# Patient Record
Sex: FEMALE | Race: WHITE | Employment: FULL TIME | URBAN - METROPOLITAN AREA
[De-identification: names, ages, dates, MRNs, and addresses within clinical notes are randomized per-mention and may not be internally consistent; named-entity substitution may affect disease eponyms.]

---
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
## 2021-01-21 NOTE — GROUP NOTE
Group Therapy Note    Date: 9/16/2020    Group Start Time: 0900  Group End Time: 0920  Group Topic: Community Meeting    LIZANDRO IBARRA    MAKSIM Huggins    Group Therapy Note    Attendees: 5    Patient's Goal:  Pt will identify one daily goal and demonstrate understanding of unit guidelines and schedule    Notes:  Pt attended group and participated. Status After Intervention:  Improved    Participation Level:  Active Listener and Interactive    Participation Quality: Appropriate, Attentive, Sharing and Supportive      Speech:  normal      Thought Process/Content: Logical  Linear      Affective Functioning: Constricted/Restricted      Mood: euthymic      Level of consciousness:  Alert, Oriented x4 and Attentive      Response to Learning: Able to verbalize current knowledge/experience, Able to verbalize/acknowledge new learning, Able to retain information, Capable of insight, Able to change behavior and Progressing to goal      Endings: None Reported    Modes of Intervention: Education, Support, Socialization, Exploration, Activity and Limit-setting      Discipline Responsible: Psychoeducational Specialist      Signature:  Abrahan Huggins None

## 2021-02-11 ENCOUNTER — TELEPHONE (OUTPATIENT)
Dept: PSYCHIATRY | Age: 21
End: 2021-02-11

## 2021-02-11 DIAGNOSIS — F32.4 MAJOR DEPRESSIVE DISORDER WITH SINGLE EPISODE, IN PARTIAL REMISSION (HCC): Primary | ICD-10-CM

## 2021-02-11 RX ORDER — VENLAFAXINE HYDROCHLORIDE 37.5 MG/1
37.5 CAPSULE, EXTENDED RELEASE ORAL
Qty: 30 CAPSULE | Refills: 3 | Status: SHIPPED | OUTPATIENT
Start: 2021-02-11 | End: 2021-08-29

## 2021-02-11 NOTE — TELEPHONE ENCOUNTER
.Please Approve or Refuse. Send to Pharmacy per Pt's Request:      Next Visit Date:  Visit date not found   Last Visit Date: Visit date not found    Refill approved.      Lucius Watson

## 2021-08-29 ENCOUNTER — HOSPITAL ENCOUNTER (EMERGENCY)
Age: 21
Discharge: HOME | End: 2021-08-29
Attending: EMERGENCY MEDICINE
Payer: COMMERCIAL

## 2021-08-29 ENCOUNTER — APPOINTMENT (OUTPATIENT)
Dept: GENERAL RADIOLOGY | Age: 21
End: 2021-08-29
Payer: COMMERCIAL

## 2021-08-29 VITALS
SYSTOLIC BLOOD PRESSURE: 124 MMHG | HEART RATE: 106 BPM | TEMPERATURE: 98.3 F | DIASTOLIC BLOOD PRESSURE: 83 MMHG | RESPIRATION RATE: 16 BRPM | BODY MASS INDEX: 23.92 KG/M2 | WEIGHT: 130 LBS | OXYGEN SATURATION: 96 % | HEIGHT: 62 IN

## 2021-08-29 DIAGNOSIS — F32.A DEPRESSION, UNSPECIFIED DEPRESSION TYPE: ICD-10-CM

## 2021-08-29 DIAGNOSIS — S60.221A CONTUSION OF RIGHT HAND, INITIAL ENCOUNTER: Primary | ICD-10-CM

## 2021-08-29 PROCEDURE — 73130 X-RAY EXAM OF HAND: CPT

## 2021-08-29 PROCEDURE — 99283 EMERGENCY DEPT VISIT LOW MDM: CPT

## 2021-08-29 RX ORDER — DEXTROAMPHETAMINE SACCHARATE, AMPHETAMINE ASPARTATE MONOHYDRATE, DEXTROAMPHETAMINE SULFATE AND AMPHETAMINE SULFATE 2.5; 2.5; 2.5; 2.5 MG/1; MG/1; MG/1; MG/1
10 CAPSULE, EXTENDED RELEASE ORAL EVERY MORNING
COMMUNITY

## 2021-08-29 ASSESSMENT — ENCOUNTER SYMPTOMS
EYE DISCHARGE: 0
SINUS PRESSURE: 0
RHINORRHEA: 0
NAUSEA: 0
BLOOD IN STOOL: 0
ABDOMINAL PAIN: 0
CONSTIPATION: 0
VOMITING: 0
CHEST TIGHTNESS: 0
WHEEZING: 0
FACIAL SWELLING: 0
EYE REDNESS: 0
DIARRHEA: 0
SORE THROAT: 0
EYE PAIN: 0
COUGH: 0
BACK PAIN: 0
TROUBLE SWALLOWING: 0
SHORTNESS OF BREATH: 0
COLOR CHANGE: 0

## 2021-08-29 ASSESSMENT — PAIN - FUNCTIONAL ASSESSMENT: PAIN_FUNCTIONAL_ASSESSMENT: 0-10
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
## 2021-08-29 NOTE — ED TRIAGE NOTES
Patient's personal belongings secured and patient changed into blue gown by [employer] Safeguard informed of 1:1 watch and that they must be able to view patient's face at all time and may not leave at any time, under any circumstances. Safeguard instructed to call on radio or yell for help if patient attempts to leave or harm self/others. Mode of arrival (squad #, walk in, police, etc) : [employer]      Chief complaint(s):hand injury and mental health problems      Arrival Note (brief scenario, treatment PTA, etc). : in argument with mother today about her patient dog she  and stated hit wall and ground with fist pain to right hand . States not suicidal or homicidal at present have not had medication for few days also due to doctor leaving practice         C= \"Have you ever felt that you should Cut down on your drinking? \"  No  A= \"Have people Annoyed you by criticizing your drinking? \"  No  G= \"Have you ever felt bad or Guilty about your drinking? \"  No  E= \"Have you ever had a drink as an Eye-opener first thing in the morning to steady your nerves or to help a hangover? \"  No      Deferred []      Reason for deferring: N/A    *If yes to two or more: probable alcohol abuse. *

## 2021-08-29 NOTE — SUICIDE SAFETY PLAN
Counselor  Friends  Family   Crisis line        4. Professionals or [address] I can contact during a crisis: Who can I call for help - for example, my doctor, my psychiatrist, my psychologist, a mental health provider, a suicide hotline? [place] (24 hours / 7 days a week access)  818-538-Care ([phone])    COVID-19 Emotional Support Line: [phone]  Suicide Prevention Lifeline: 1-800-273-TALK (5757)  Text \"4Hope\" to 878494      5. [facility] Emergency Crisis Services Numbers:      [place] (24 hours / 7 days a week acess)  837-259-Care ([phone])    COVID-19 Emotional Support Line: [phone]  Suicide Prevention Lifeline: 1-800-273-TALK (6910)  Text \"4Hope\" to [address]     1. BellyBio  Free addie that teaches a deep breathing technique useful in fighting anxiety and stress. A simple interface uses biofeedback to monitor your breathing. Sounds cascade with the movements of your belly, in rhythms reminiscent of waves on a beach. Charts also let you know how you're doing. A great tool when you need to slow down and breathe. 2. Operation Reach Out  Literally a lifesaving addie, this free intervention tool helps people who are having suicidal thoughts to reassess their thinking and get help. Recommended by followers of, who report that this addie has helped in suicidal crises. Developed by the [employer], but useful to all. [place] a download even if you're not suicidal. You never know if you might need it. 3. eCBT Calm  Provides a set of tools to help you evaluate personal stress and anxiety, challenge distorted thoughts, and learn relaxation skills that have been scientifically validated in research on Cognitive Behavioral Therapy (CBT). Lots of background and useful information along with step-by-step guides. 4. Deep Sleep with Gilson Lava  Getting enough sleep is one of the foundations of mental health.  A personal favorite I listen to all the time, this straightforward addie features a warm, gentle voice guiding listeners through a Progressive Muscle Relaxation (PMR) session and into sleep. Features long or short induction options, and an alarm. 5. WhatsMyM3  A three minute depression and anxiety screen. Validated questionnaires assess symptoms of depression, anxiety, bipolar disorder, and PTSD, and combine into a score that indicates whether or not your life is impacted significantly by a mood disorder, recommending a course of action. The addie keeps a history of test results, to help you track your progress. 6. DBT Diary Card and Skills   Based on Dialectical Behavior Therapy (DBT) developed by psychologist Gary Jeffers, this addie is a rich resource of self-help skills, reminders of the therapy principles, and coaching tools for coping. Created by a therapist with years of experience in the practice, this addie is not intended to replace a professional but helps people reinforce their treatment. 7. Optimism  Track your moods, keep a journal, and chart your recovery progress with this comprehensive tool for depression, bipolar disorder, and anxiety disorders. One of the most popular mood tracking apps available, with plenty of features. Free. 8. iSleepEasy  A calm female voice helps you quell anxieties and take the time to relax and sleep, in an array of guided meditations. Separately controlled voice and music tracks, flexible lengths, and an alarm. Includes a special wee hours rescue track, and tips for falling asleep. Developed by [employer], who offer an great line of relaxation apps. 9. Magic Window  Living Pictures  Not technically a mental health addie, it makes no miraculous claims about curbing anxiety. However, there is independent research indicating that taking breaks and getting exposure to nature, even in videos, can reduce stress.  This addie offers an assortment of peaceful, ambient nature scenes from beautiful spots around the world. 10. Relax Melodies  A popular free relaxation sound and music addie. Mix and match nature sounds with new age music; it's jasmin to listen to birds in the rain while a piano softly plays. 6.   Making the environment safe: How can I make my environment (house/apartment/living space) safer? Remove weapons, cutting objects, get rid of extra medications, household chemicals. If you begin to have suicidal ideations -Involve your support system to implement your safety plan right away. Call 911 immediately or go to your local Emergency Room if you cannot contract to be safe.

## 2022-06-27 ENCOUNTER — HOSPITAL ENCOUNTER (EMERGENCY)
Age: 22
Discharge: HOME | End: 2022-06-27
Attending: EMERGENCY MEDICINE
Payer: COMMERCIAL

## 2022-06-27 VITALS
WEIGHT: 118 LBS | TEMPERATURE: 98.2 F | SYSTOLIC BLOOD PRESSURE: 120 MMHG | OXYGEN SATURATION: 98 % | DIASTOLIC BLOOD PRESSURE: 91 MMHG | RESPIRATION RATE: 20 BRPM | HEIGHT: 63 IN | BODY MASS INDEX: 20.91 KG/M2 | HEART RATE: 88 BPM

## 2022-06-27 DIAGNOSIS — Z76.89 ENCOUNTER FOR PSYCHIATRIC ASSESSMENT: Primary | ICD-10-CM

## 2022-06-27 PROCEDURE — 99285 EMERGENCY DEPT VISIT HI MDM: CPT

## 2022-06-27 ASSESSMENT — ENCOUNTER SYMPTOMS
BACK PAIN: 0
EYE PAIN: 0
COLOR CHANGE: 0
ABDOMINAL PAIN: 0
SHORTNESS OF BREATH: 0

## 2022-06-27 ASSESSMENT — PAIN DESCRIPTION - LOCATION: LOCATION: LEG

## 2022-06-27 ASSESSMENT — PAIN DESCRIPTION - ORIENTATION: ORIENTATION: LEFT;MID;ANTERIOR

## 2022-06-27 ASSESSMENT — PAIN SCALES - GENERAL: PAINLEVEL_OUTOF10: 3

## 2022-06-27 ASSESSMENT — PAIN DESCRIPTION - DESCRIPTORS: DESCRIPTORS: BURNING;TIGHTNESS

## 2022-06-27 ASSESSMENT — LIFESTYLE VARIABLES: HOW OFTEN DO YOU HAVE A DRINK CONTAINING ALCOHOL: NEVER

## 2022-06-27 ASSESSMENT — PAIN - FUNCTIONAL ASSESSMENT: PAIN_FUNCTIONAL_ASSESSMENT: 0-10

## 2022-06-27 NOTE — ED PROVIDER NOTES
EMERGENCY DEPARTMENT ENCOUNTER    Pt Name: Anthony Morataya  MRN: [id number]  [place] 2000  Date of evaluation: 6/27/22  CHIEF COMPLAINT       Chief Complaint   Patient presents with    Mental Health Problem     HISTORY OF PRESENT ILLNESS   20-year-old female presents for mental health evaluation. Patient was reportedly at work today and became upset with a coworker and reportedly stated that she was suicidal patient was then brought for evaluation. Patient reports that she has been off of her medication for the last few days because she ran out of her meds. Patient reports that her meds have otherwise been working well for her, currently denies any suicidal ideation, denies any plan of suicide, denies any homicidal ideation, denies any visual auditory hallucinations, denies any recent alcohol or drug use, denying other complaints at this time. The history is provided by the patient. REVIEW OF SYSTEMS     Review of Systems   Constitutional: Negative for fever. HENT: Negative for congestion and ear pain. Eyes: Negative for pain. Respiratory: Negative for shortness of breath. Cardiovascular: Negative for chest pain, palpitations and leg swelling. Gastrointestinal: Negative for abdominal pain. Genitourinary: Negative for dysuria and flank pain. Musculoskeletal: Negative for back pain. Skin: Negative for color change. Neurological: Negative for numbness and headaches. Psychiatric/Behavioral: Negative for confusion, hallucinations and suicidal ideas. All other systems reviewed and are negative.     PASTMEDICAL HISTORY     Past Medical History:   Diagnosis Date    acne 7/15/2014    Attention deficit disorder without mention of hyperactivity 7/15/2014    Bipolar 1 disorder (HCC)     Borderline personality disorder [facility])      Past Problem List  Patient Active Problem List   Diagnosis Code    Viral warts B07.9    Attention deficit disorder F98.8    acne L70.8    Major depressive disorder with single episode, in partial remission ([facility] [address] F32.4    Depression F32. A    Anxiety F41.9    Depression with suicidal ideation F32. 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 No pertinent surgical history. CURRENT MEDICATIONS       Discharge Medication List as of 6/27/2022  3:06 PM      CONTINUE these medications which have NOT CHANGED    Details   amphetamine-dextroamphetamine (ADDERALL XR) 10 MG extended release capsule Take 10 mg by mouth every morning. Historical Med      cephALEXin (KEFLEX) 250 MG capsule Take 1 capsule by mouth 2 times daily, Disp-8 capsule,R-0Normal      traZODone (DESYREL) 50 MG tablet Take 50 mg by mouth nightlyHistorical Med      hydrOXYzine (ATARAX) 25 MG tablet Take 10 mg by mouth 3 times daily as needed for AnxietyHistorical Med           ALLERGIES     is allergic to lamictal [lamotrigine] and provera [medroxyprogesterone acetate]. FAMILY HISTORY     She indicated that her mother is alive. She indicated that the status of her father is unknown. She indicated that the status of her brother is unknown. She indicated that her maternal grandmother is alive. She indicated that the status of her paternal grandmother is unknown. She indicated that the status of her paternal grandfather is unknown. She indicated that the status of her maternal cousin is unknown. SOCIAL HISTORY       Social History     Tobacco Use    Smoking status: Never Smoker    Smokeless tobacco: Never Used   Substance Use Topics    Alcohol use: No    Drug use: Not Currently     Types: Marijuana (Weed)     PHYSICAL EXAM     INITIAL VITALS: BP (!) 120/91   Pulse 88   Temp 98.2 °F (36.8 °C) (Oral)   Resp 20   Ht 5' 3\" (1.6 m)   Wt 118 lb (53.5 kg)   LMP 06/15/2022 (Exact Date)   SpO2 98%   BMI 20.90 kg/m²    Physical Exam  Vitals and nursing note reviewed.    Constitutional: homicidal ideation, states that she been off her medication for the last couple days states that she got upset with someone at work and reports that she just became very upset reports no intention of killing herself, denies any plan, denies any homicidal ideation, patient reports that she has a appointment with her psychiatrist for refill of meds this week, patient to be followed by social work    Patient was evaluated social work as well, discussed with social work both myself and social work do not feel patient would benefit from an admission at this time and she is not meeting inpatient criteria as she has normal active suicidal thoughts or plan at this time, patient will be discharged with outpatient follow-up as follow-up with her psychiatrist on Friday, will be discharged with her boyfriend who reports that he will be with her for the next few days    Plan was discussed with patient and boyfriend, discussed strict return precautions, both patient and significant other voiced understanding and are comfortable with plan and discharge home    Patient/Guardian was informed of their diagnosis and told to follow up with PCP & [place] in 1-3 days. Patient demonstrates understanding and agreement with the plan. They were given the opportunity to ask questions and those questions were answered to the best of our ability with the available information. Patient/Guardian told to return to the ED for any new, worsening, changing or persistent symptoms. This dictation was prepared using [employer] voice recognition software.          CRITICAL CARE:       PROCEDURES:    Procedures    DIAGNOSTIC RESULTS   EKG:All EKG's are interpreted by the Emergency Department Physician who either signs or Co-signs this chart in the absence of a cardiologist.        RADIOLOGY:All plain film, CT, MRI, and formal ultrasound images (except ED bedside ultrasound) are read by the radiologist, see reports below, unless otherwisenoted in MDM or here. No orders to display     LABS: All lab results were reviewed by myself, and all abnormals are listed below. Labs Reviewed - No data to display    EMERGENCY DEPARTMENTCOURSE:         Vitals:    Vitals:    06/27/22 1419   BP: (!) 120/91   Pulse: 88   Resp: 20   Temp: 98.2 °F (36.8 °C)   TempSrc: Oral   SpO2: 98%   Weight: 118 lb (53.5 kg)   Height: 5' 3\" (1.6 m)       The patient was given the following medications while in the emergency department:  No orders of the defined types were placed in this encounter. CONSULTS:  None    FINAL IMPRESSION      1. Encounter for psychiatric assessment          DISPOSITION/PLAN   DISPOSITION Decision To Discharge 06/27/2022 03:00:39 PM      PATIENT REFERRED TO:  [facility]  [address]  [address] [facility] [address]  [phone]    As needed, If symptoms worsen    [facility]  [address] [facility]  [phone]  Schedule an appointment as soon as possible for a visit       [facility]  C[address]Vicente Cid  [address] [facility]    Go to       [address]:  Discharge Medication List as of 6/27/2022  3:06 PM        The care is provided during an unprecedented national emergency due to the novel coronavirus, COVID 19.   [address], DO                    [address], DO  06/27/22 8618

## 2022-06-27 NOTE — ED NOTES
Psychosocial and Contextual Factors:   Pt got into fight at work where she threatened suicide. Pt reported no true SI. Pt's fiance reported pt mentioned suicide a month ago, but mentioned no plan. C-SSRS Summary:      Patient: X  Family: X (Fisaige)  Agency:     Substance Abuse: None indicated     Present Suicidal Behavior:   Pt got into fight at work where she threatened suicide. Pt reported no true SI. Pt reported no true SI. Pt's fiance reported pt mentioned suicide a month ago, but mentioned no plan. Verbal: Yes    Attempt:None    Past Suicidal Behavior: Pt reported past attempt in 2016. Verbal:Yes     Attempt:Yes       Self-Injurious/Self-Mutilation:Denied       Violence Current or Past Denied       Trauma Identified:  Reported past trauma, but did not elaborate. Protective Factors:    Supportive fiance      Risk Factors:    Trauma hx       Clinical Summary:    Pt is a 25 y.o. female who got into fight at work where she threatened suicide. Pt reported no true SI. Pt's fiance reported pt mentioned suicide a month ago, but mentioned no plan. Pt reported she could stay with saige until her next therapy appointment at [facility]. Pt's fiance agreeable to this plan and committed supervising pt until her appt on Friday. Level of Care Disposition:    Pt agreeable to staying with saige for safety until next [facility] appointment.

## 2022-06-27 NOTE — ED TRIAGE NOTES
The patient arrived by ambulance and is accompanied by EMS provider ([address] [place] [address]). Pt reported that she was at work at the [employer] when she began to feel suicidal related to the situations surrounding her. Pt reported that her interactions with her boss prompted these thoughts. Pt also stated that her psychologist canceled her appointment last Friday, resulting in her running out of her medications. The patient stated that her current medication regimen is working well for her and she has not felt this way in some time. Pt reports history of suicide attempt at age 14y. Pt also reports history of suicidal thoughts since age 18y. Pt denies plan, but stated that at the time she was \"freaking out\" that she wished she \"didn't want to exist anymore\" because everyone she cares about hurts her. Pt denies all forms of abuse. Pt stated that now that she is \"out of that environment\" that she is feeling better and is no longer feeling suicidal. Pt reports that she has had feelings in the past that occur while she is under stress, then subside after the stressor is removed. Pt reported that ida is coming to the hospital to escort her home if she is discharged today. Dr. Greg Briseno at bedside. Denies need to change patient out of plain clothes. Sitter remains at bedside. Call light in reach. C= \"Have you ever felt that you should Cut down on your drinking? \"  No  A= \"Have people Annoyed you by criticizing your drinking? \"  No  G= \"Have you ever felt bad or Guilty about your drinking? \"  No  E= \"Have you ever had a drink as an Eye-opener first thing in the morning to steady your nerves or to help a hangover? \"  No      Deferred []      Reason for deferring: N/A    *If yes to two or more: probable alcohol abuse. *

## 2022-08-28 ENCOUNTER — HOSPITAL ENCOUNTER (EMERGENCY)
Age: 22
Discharge: HOME | End: 2022-08-28
Attending: EMERGENCY MEDICINE
Payer: COMMERCIAL

## 2022-08-28 VITALS
RESPIRATION RATE: 16 BRPM | TEMPERATURE: 97.5 F | OXYGEN SATURATION: 98 % | SYSTOLIC BLOOD PRESSURE: 119 MMHG | HEART RATE: 77 BPM | DIASTOLIC BLOOD PRESSURE: 81 MMHG

## 2022-08-28 DIAGNOSIS — N12 PYELONEPHRITIS: Primary | ICD-10-CM

## 2022-08-28 LAB
ABSOLUTE EOS #: 0.03 K/UL (ref 0–0.44)
ABSOLUTE IMMATURE GRANULOCYTE: <0.03 K/UL (ref 0–0.3)
ABSOLUTE LYMPH #: 1.7 K/UL (ref 1.1–3.7)
ABSOLUTE MONO #: 0.57 K/UL (ref 0.1–1.4)
ALBUMIN SERPL-MCNC: 4.6 G/DL (ref 3.5–5.2)
ALBUMIN/GLOBULIN RATIO: 1.4 (ref 1–2.5)
ALP BLD-CCNC: 47 U/L (ref 35–104)
ALT SERPL-CCNC: 19 U/L (ref 5–33)
ANION GAP SERPL CALCULATED.3IONS-SCNC: 9 MMOL/L (ref 9–17)
AST SERPL-CCNC: 17 U/L
BASOPHILS # BLD: 0 % (ref 0–2)
BASOPHILS ABSOLUTE: <0.03 K/UL (ref 0–0.2)
BILIRUB SERPL-MCNC: 0.51 MG/DL (ref 0.3–1.2)
BILIRUBIN URINE: NEGATIVE
BUN BLDV-MCNC: 14 MG/DL (ref 6–20)
CALCIUM SERPL-MCNC: 9.4 MG/DL (ref 8.6–10.4)
CASTS UA: NORMAL /LPF (ref 0–8)
CHLORIDE BLD-SCNC: 105 MMOL/L (ref 98–107)
CO2: 25 MMOL/L (ref 20–31)
COLOR: YELLOW
CREAT SERPL-MCNC: 0.72 MG/DL (ref 0.5–0.9)
EOSINOPHILS RELATIVE PERCENT: 0 % (ref 1–4)
EPITHELIAL CELLS UA: NORMAL /HPF (ref 0–5)
GFR AFRICAN AMERICAN: >60 ML/MIN
GFR NON-AFRICAN AMERICAN: >60 ML/MIN
GFR SERPL CREATININE-BSD FRML MDRD: NORMAL ML/MIN/{1.73_M2}
GLUCOSE BLD-MCNC: 78 MG/DL (ref 70–99)
GLUCOSE URINE: NEGATIVE
HCG QUALITATIVE: NEGATIVE
HCT VFR BLD CALC: 43.9 % (ref 36.3–47.1)
HEMOGLOBIN: 14.4 G/DL (ref 11.9–15.1)
IMMATURE GRANULOCYTES: 0 %
KETONES, URINE: NEGATIVE
LEUKOCYTE ESTERASE, URINE: ABNORMAL
LIPASE: 40 U/L (ref 13–60)
LYMPHOCYTES # BLD: 24 % (ref 25–45)
MCH RBC QN AUTO: 30.1 PG (ref 25.2–33.5)
MCHC RBC AUTO-ENTMCNC: 32.8 G/DL (ref 28.4–34.8)
MCV RBC AUTO: 91.8 FL (ref 82.6–102.9)
MONOCYTES # BLD: 8 % (ref 2–8)
NITRITE, URINE: NEGATIVE
NRBC AUTOMATED: 0 PER 100 WBC
PDW BLD-RTO: 12.4 % (ref 11.8–14.4)
PH UA: 5.5 (ref 5–8)
PLATELET # BLD: 312 K/UL (ref 138–453)
PMV BLD AUTO: 10.8 FL (ref 8.1–13.5)
POTASSIUM SERPL-SCNC: 4.6 MMOL/L (ref 3.7–5.3)
PROTEIN UA: ABNORMAL
RBC # BLD: 4.78 M/UL (ref 3.95–5.11)
RBC UA: NORMAL /HPF (ref 0–4)
SEG NEUTROPHILS: 68 % (ref 34–64)
SEGMENTED NEUTROPHILS ABSOLUTE COUNT: 4.79 K/UL (ref 1.5–8.1)
SODIUM BLD-SCNC: 139 MMOL/L (ref 135–144)
SPECIFIC GRAVITY UA: 1.03 (ref 1–1.03)
TOTAL PROTEIN: 7.8 G/DL (ref 6.4–8.3)
TURBIDITY: CLEAR
URINE HGB: NEGATIVE
UROBILINOGEN, URINE: NORMAL
WBC # BLD: 7.1 K/UL (ref 4.5–13.5)
WBC UA: NORMAL /HPF (ref 0–5)

## 2022-08-28 PROCEDURE — 81001 URINALYSIS AUTO W/SCOPE: CPT

## 2022-08-28 PROCEDURE — 84703 CHORIONIC GONADOTROPIN ASSAY: CPT

## 2022-08-28 PROCEDURE — 87086 URINE CULTURE/COLONY COUNT: CPT

## 2022-08-28 PROCEDURE — 85025 COMPLETE CBC W/AUTO DIFF WBC: CPT

## 2022-08-28 PROCEDURE — 83690 ASSAY OF LIPASE: CPT

## 2022-08-28 PROCEDURE — 99283 EMERGENCY DEPT VISIT LOW MDM: CPT

## 2022-08-28 PROCEDURE — 80053 COMPREHEN METABOLIC PANEL: CPT

## 2022-08-28 PROCEDURE — 6370000000 HC RX 637 (ALT 250 FOR IP)

## 2022-08-28 RX ORDER — FAMOTIDINE 20 MG/1
20 TABLET, FILM COATED ORAL 2 TIMES DAILY
Qty: 14 TABLET | Refills: 0 | Status: SHIPPED | OUTPATIENT
Start: 2022-08-28 | End: 2022-09-04

## 2022-08-28 RX ORDER — FAMOTIDINE 20 MG/1
20 TABLET, FILM COATED ORAL ONCE
Status: COMPLETED | OUTPATIENT
Start: 2022-08-28 | End: 2022-08-28

## 2022-08-28 RX ADMIN — FAMOTIDINE 20 MG: 20 TABLET, FILM COATED ORAL at 11:21

## 2022-08-28 ASSESSMENT — PAIN - FUNCTIONAL ASSESSMENT: PAIN_FUNCTIONAL_ASSESSMENT: 0-10

## 2022-08-28 ASSESSMENT — PAIN SCALES - GENERAL
PAINLEVEL_OUTOF10: 8
PAINLEVEL_OUTOF10: 7

## 2022-08-28 ASSESSMENT — PAIN DESCRIPTION - LOCATION: LOCATION: FLANK

## 2022-08-28 ASSESSMENT — PAIN DESCRIPTION - PAIN TYPE: TYPE: ACUTE PAIN

## 2022-08-28 ASSESSMENT — PAIN DESCRIPTION - ORIENTATION: ORIENTATION: LEFT

## 2022-08-28 NOTE — DISCHARGE INSTRUCTIONS
Thank you for visiting [address] [place] [facility] Emergency Department. You need to call [facility] to make an appointment as directed for follow up. Should you have any questions regarding your care or further treatment, please call [facility] Emergency Department at [phone]. Continue ciprofloxacin as prescribed.

## 2022-08-28 NOTE — ED PROVIDER NOTES
[facility]     Emergency Department     Faculty Note/ Attestation      Pt Name: Sunita Rousseau                                       MRN: [id number]  [place] 2000  Date of evaluation: 8/28/2022    Patients PCP:    No primary care provider on file. Attestation  I performed a history and physical examination of the patient and discussed management with the resident. I reviewed the residents note and agree with the documented findings and plan of care. Any areas of disagreement are noted on the chart. I was personally present for the key portions of any procedures. I have documented in the chart those procedures where I was not present during the key portions. I have reviewed the emergency nurses triage note. I agree with the chief complaint, past medical history, past surgical history, allergies, medications, social and family history as documented unless otherwise noted below. For Physician Assistant/ Nurse Practitioner cases/documentation I have personally evaluated this patient and have completed at least one if not all key elements of the E/M (history, physical exam, and MDM). Additional findings are as noted. Initial Screens:        [place] Coma Scale  Eye Opening: Spontaneous  Best Verbal Response: Oriented  Best Motor Response: Obeys commands  [place] Coma Scale Score: 15    Vitals:    Vitals:    08/28/22 0900   BP: 119/81   Pulse: 77   Resp: 16   Temp: 97.5 °F (36.4 °C)   TempSrc: Oral   SpO2: 98%       CHIEF COMPLAINT     No chief complaint on file. The pt is a 24 YO who presents with LUQ abd pain she was diagnosed with pyeloneprhitis with klebsiella and sensitive to ciprofloxacin and taking it as directly. The pt still having the vomiting but the burning pain with urination is still going on. The pain is lateral flank not really abdomen.           EMERGENCY DEPARTMENT COURSE:     -------------------------  BP: 119/81, Temp: 97.5 °F (36.4 °C), Heart Rate: 77, Resp: 16  Physical Exam  Constitutional:       Appearance: She is well-developed. She is not diaphoretic. HENT:      Head: Normocephalic and atraumatic. Right Ear: External ear normal.      Left Ear: External ear normal.   Eyes:      General: No scleral icterus. Right eye: No discharge. Left eye: No discharge. Neck:      Trachea: No tracheal deviation. Pulmonary:      Effort: Pulmonary effort is normal. No respiratory distress. Breath sounds: No stridor. Abdominal:      General: There is no distension. Tenderness: There is abdominal tenderness (only tender along the L flank no pain elsewer in abd). Musculoskeletal:         General: Normal range of motion. Cervical back: Normal range of motion. Skin:     General: Skin is warm and dry. Neurological:      Mental Status: She is alert and oriented to person, place, and time. Coordination: Coordination normal.   Psychiatric:         Behavior: Behavior normal.         Comments  The patient presents complaining of LUQ abdominal /flankpain. Based on exam of the patient the patient is very unlikely to have: PE pneumonia without any shortness of breath leg swelling or edema patient has lipase pending as well as labs for possible abscess discussed possibility of CT with patient at this time no signs of pneumonia on physical exam clear lungs to auscultation bilaterally most concerning and most likely diagnosis is still likely clearing pyelonephritis    Pt likely clearing pyeloneprhtis    ED Course as of 08/28/22 1617   Sun Aug 28, 2022   1112 WBC, UA: 10 TO 20 [AR]   1112 Epithelial Cells, UA: 5 TO 10 [AR]   1112 Leukocyte Esterase, Urine(!): TRACE [AR]   1112 Lipase: 40 [AR]   1112 WBC: 7.1 [AR]      ED Course User Index  [AR] MD Lena Myles Score, DO,, DO, RDMS.   Attending Emergency Physician         Lena Score, DO  08/28/22 4770

## 2022-08-28 NOTE — ED PROVIDER NOTES
[facility] ED  Emergency Department Encounter  Emergency Medicine Resident     Pt Baldomero Horn  MRN: [id number]  [place] 2000  Date of evaluation: 8/28/22  PCP:  No primary care provider on file. CHIEF COMPLAINT       No chief complaint on file. HISTORY OF PRESENT ILLNESS  (Location/Symptom, Timing/Onset, Context/Setting, Quality, Duration, Modifying Factors, Severity.)      Carl Mason is a 25 y.o. female who presents with complaints of constant left flank pain that radiates to left upper quadrant. Patient notes she was diagnosed with pyelonephritis few days ago and has been taking Cipro. Patient notes vomiting improved but dysuria still mildly present. No diarrhea or constipation. No chest pain or shortness of breath. Patient noted pain is worse after eating and reproduced in left flank. On chart review, urine culture 8/23/2022 showed Klebsiella pneumonia that is sensitive to Cipro. PAST MEDICAL / SURGICAL / SOCIAL / FAMILY HISTORY      has a past medical history of acne, Attention deficit disorder without mention of hyperactivity, Bipolar 1 disorder ([facility] [address] and Borderline personality disorder ([facility] [address] Reviewed with patient     has no past surgical history on file.   Reviewed with patient    Social History     Socioeconomic History    Marital status: Single     Spouse name: Not on file    Number of children: Not on file    Years of education: Not on file    Highest education level: Not on file   Occupational History    Not on file   Tobacco Use    Smoking status: Never    Smokeless tobacco: Never   Substance and Sexual Activity    Alcohol use: No    Drug use: Not Currently     Types: Marijuana Liz Sims)    Sexual activity: Yes     Partners: Male     Birth control/protection: Pill   Other Topics Concern    Not on file   Social History Narrative    Not on file     Social Determinants of Health     Financial Resource Strain: Not on file   Food Insecurity: Not on file   Transportation Needs: Not on file   Physical Activity: Not on file   Stress: Not on file   Social Connections: Not on file   Intimate Partner Violence: Not on file   Housing Stability: Not on file       Family History   Problem Relation Age of Onset    High Blood Pressure Mother     Depression Mother     Migraines Father     High Cholesterol Father     Migraines Brother     High Blood Pressure Maternal Grandmother     High Cholesterol Maternal Grandmother     Depression Maternal Cousin     Alcohol Abuse Paternal Grandmother     Alcohol Abuse Paternal Grandfather        Allergies:  Lamictal [lamotrigine] and Provera [medroxyprogesterone acetate]    Home Medications:  Prior to Admission medications    Medication Sig Start Date End Date Taking? Authorizing Provider   famotidine (PEPCID) 20 MG tablet Take 1 tablet by mouth 2 times daily for 7 days 8/28/22 9/4/22 Yes Tea Raymond MD   amphetamine-dextroamphetamine (ADDERALL XR) 10 MG extended release capsule Take 10 mg by mouth every morning. Historical Provider, MD   cephALEXin (KEFLEX) 250 MG capsule Take 1 capsule by mouth 2 times daily 9/19/20   Janelle Pearson MD   traZODone (DESYREL) 50 MG tablet Take 50 mg by mouth nightly    Historical Provider, MD   hydrOXYzine (ATARAX) 25 MG tablet Take 10 mg by mouth 3 times daily as needed for Anxiety    Historical Provider, MD       REVIEW OF SYSTEMS    (2-9 systems for level 4, 10 or more for level 5)      Review of Systems   Constitutional:  Negative for chills and fever. HENT:  Negative for congestion and rhinorrhea. Eyes:  Negative for photophobia and visual disturbance. Respiratory:  Negative for shortness of breath and wheezing. Cardiovascular:  Negative for chest pain and palpitations. Gastrointestinal:  Negative for abdominal pain, nausea and vomiting. Genitourinary:  Positive for dysuria and flank pain. Musculoskeletal:  Negative for back pain and neck pain.    Skin:  Negative for rash EMERGENCY DEPARTMENT COURSE / MDM   LAB RESULTS:  Results for orders placed or performed during the hospital encounter of 08/28/22   Culture, Urine    Specimen: Urine   Result Value Ref Range    Specimen Description . URINE     Culture NO SIGNIFICANT GROWTH    Urinalysis with Reflex to Culture    Specimen: Urine   Result Value Ref Range    Color, UA Yellow Yellow    Turbidity UA Clear Clear    Glucose, Ur NEGATIVE NEGATIVE    Bilirubin Urine NEGATIVE NEGATIVE    Ketones, Urine NEGATIVE NEGATIVE    Specific Gravity, UA 1.027 1.005 - 1.030    Urine Hgb NEGATIVE NEGATIVE    pH, UA 5.5 5.0 - 8.0    Protein, UA TRACE (A) NEGATIVE    Urobilinogen, Urine Normal Normal    Nitrite, Urine NEGATIVE NEGATIVE    Leukocyte Esterase, Urine TRACE (A) NEGATIVE   CBC with Auto Differential   Result Value Ref Range    WBC 7.1 4.5 - 13.5 k/uL    RBC 4.78 3.95 - 5.11 m/uL    Hemoglobin 14.4 11.9 - 15.1 g/dL    Hematocrit 43.9 36.3 - 47.1 %    MCV 91.8 82.6 - 102.9 fL    MCH 30.1 25.2 - 33.5 pg    MCHC 32.8 28.4 - 34.8 g/dL    RDW 12.4 11.8 - 14.4 %    Platelets 311 003 - 381 k/uL    MPV 10.8 8.1 - 13.5 fL    NRBC Automated 0.0 0.0 per 100 WBC    Seg Neutrophils 68 (H) 34 - 64 %    Lymphocytes 24 (L) 25 - 45 %    Monocytes 8 2 - 8 %    Eosinophils % 0 (L) 1 - 4 %    Basophils 0 0 - 2 %    Immature Granulocytes 0 0 %    Segs Absolute 4.79 1.50 - 8.10 k/uL    Absolute Lymph # 1.70 1.10 - 3.70 k/uL    Absolute Mono # 0.57 0.10 - 1.40 k/uL    Absolute Eos # 0.03 0.00 - 0.44 k/uL    Basophils Absolute <0.03 0.00 - 0.20 k/uL    Absolute Immature Granulocyte <0.03 0.00 - 0.30 k/uL   Comprehensive Metabolic Panel   Result Value Ref Range    Glucose 78 70 - 99 mg/dL    BUN 14 6 - 20 mg/dL    Creatinine 0.72 0.50 - 0.90 mg/dL    Calcium 9.4 8.6 - 10.4 mg/dL    Sodium 139 135 - 144 mmol/L    Potassium 4.6 3.7 - 5.3 mmol/L    Chloride 105 98 - 107 mmol/L    CO2 25 20 - 31 mmol/L    Anion Gap 9 9 - 17 mmol/L    Alkaline Phosphatase 47 35 - 104 U/L ALT 19 5 - 33 U/L    AST 17 <32 U/L    Total Bilirubin 0.51 0.3 - 1.2 mg/dL    Total Protein 7.8 6.4 - 8.3 g/dL    Albumin 4.6 3.5 - 5.2 g/dL    Albumin/Globulin Ratio 1.4 1.0 - 2.5    GFR Non-African American >60 >60 mL/min    GFR African American >60 >60 mL/min    GFR Comment         Lipase   Result Value Ref Range    Lipase 40 13 - 60 U/L   HCG Qualitative, Serum   Result Value Ref Range    hCG Qual NEGATIVE NEGATIVE   Microscopic Urinalysis   Result Value Ref Range    WBC, UA 10 TO 20 0 - 5 /HPF    RBC, UA 0 TO 2 0 - 4 /HPF    Casts UA  0 - 8 /LPF     5 TO 10 HYALINE Reference range defined for non-centrifuged specimen. Epithelial Cells UA 5 TO 10 0 - 5 /HPF       IMPRESSION: Pyelonephritis    RADIOLOGY:  No orders to display       EMERGENCY DEPARTMENT COURSE:  52-year-old female, recently diagnosed with Klebsiella pneumonia pyelonephritis and started on ciprofloxacin which she has been taking, presented with complaints of continued left flank pain that is worse after eating but reproduced in left flank. Reviewed urine culture sensitivities that were sensitive to Cipro. On exam, patient afebrile, nontachycardic, abdomen soft and nontender but did have left CVA tenderness. Basic abdominal labs obtained. No evidence of leukocytosis. Liver enzymes within normal limits. Lipase normal.  hCG negative. Patient did note some improvement after Pepcid. Repeat urine obtained and consistent with infection. Patient discharged home with instruction to follow-up with PCP and also given follow-up with GI as pain described as worse after eating. Given prescription for Pepcid and instructed to continue taking Cipro as directed.     ED Course as of 08/30/22 1131   Sun Aug 28, 2022   1112 WBC, UA: 10 TO 20 [AR]   1112 Epithelial Cells, UA: 5 TO 10 [AR]   1112 Leukocyte Esterase, Urine(!): TRACE [AR]   1112 Lipase: 40 [AR]   1112 WBC: 7.1 [AR]      ED Course User Index  [AR] Nadia Gusman MD       No notes of EC

## 2022-08-28 NOTE — ED NOTES
Pt. Came in ambulated, steady gate. Complaint of left flank pain, consulted at [facility] 7 days ago with flank pain. Was sent home with pain meds and antibiotic.  Hence pain still persisted thus came to ED for consult     Lamar Jackson RN  08/28/22 2625

## 2022-08-28 NOTE — Clinical Note
To Rowe was seen and treated in our emergency department on 8/28/2022. She may return to work on 08/31/2022. Patient seen and evaluated for pyelonephritis     If you have any questions or concerns, please don't hesitate to call.       Shaye Erwin MD

## 2022-08-29 LAB
CULTURE: NORMAL
SPECIMEN DESCRIPTION: NORMAL

## 2022-08-30 ASSESSMENT — ENCOUNTER SYMPTOMS
BACK PAIN: 0
WHEEZING: 0
SHORTNESS OF BREATH: 0
VOMITING: 0
PHOTOPHOBIA: 0
RHINORRHEA: 0
NAUSEA: 0
ABDOMINAL PAIN: 0